# Patient Record
Sex: FEMALE | Race: ASIAN | NOT HISPANIC OR LATINO | URBAN - METROPOLITAN AREA
[De-identification: names, ages, dates, MRNs, and addresses within clinical notes are randomized per-mention and may not be internally consistent; named-entity substitution may affect disease eponyms.]

---

## 2023-12-06 ENCOUNTER — HOSPITAL ENCOUNTER (OUTPATIENT)
Dept: BEHAVIORAL HEALTH | Facility: CLINIC | Age: 33
Discharge: HOME | End: 2023-12-06
Attending: PSYCHIATRY & NEUROLOGY | Admitting: PSYCHIATRY & NEUROLOGY
Payer: COMMERCIAL

## 2023-12-06 VITALS — WEIGHT: 137 LBS | BODY MASS INDEX: 28.76 KG/M2 | HEIGHT: 58 IN

## 2023-12-06 PROCEDURE — H0001 ALCOHOL AND/OR DRUG ASSESS: HCPCS

## 2023-12-06 ASSESSMENT — COLUMBIA-SUICIDE SEVERITY RATING SCALE - C-SSRS
2. HAVE YOU ACTUALLY HAD ANY THOUGHTS OF KILLING YOURSELF?: NO
6. HAVE YOU EVER DONE ANYTHING, STARTED TO DO ANYTHING, OR PREPARED TO DO ANYTHING TO END YOUR LIFE?: NO
1. HAVE YOU WISHED YOU WERE DEAD OR WISHED YOU COULD GO TO SLEEP AND NOT WAKE UP?: NO
ATTEMPT LIFETIME: NO
TOTAL  NUMBER OF INTERRUPTED ATTEMPTS LIFETIME: NO
TOTAL  NUMBER OF ABORTED OR SELF INTERRUPTED ATTEMPTS LIFETIME: NO

## 2023-12-06 ASSESSMENT — ANXIETY QUESTIONNAIRES
1. FEELING NERVOUS, ANXIOUS, OR ON EDGE: NOT AT ALL
5. BEING SO RESTLESS THAT IT IS HARD TO SIT STILL: NOT AT ALL
GAD7 TOTAL SCORE: 0
4. TROUBLE RELAXING: NOT AT ALL
GAD7 TOTAL SCORE: 0
7. FEELING AFRAID AS IF SOMETHING AWFUL MIGHT HAPPEN: NOT AT ALL
2. NOT BEING ABLE TO STOP OR CONTROL WORRYING: NOT AT ALL
3. WORRYING TOO MUCH ABOUT DIFFERENT THINGS: NOT AT ALL
6. BECOMING EASILY ANNOYED OR IRRITABLE: NOT AT ALL

## 2023-12-06 ASSESSMENT — PATIENT HEALTH QUESTIONNAIRE - PHQ9: SUM OF ALL RESPONSES TO PHQ QUESTIONS 1-9: 0

## 2023-12-06 ASSESSMENT — PAIN SCALES - GENERAL: PAINLEVEL: NO PAIN (0)

## 2023-12-06 NOTE — PROGRESS NOTES
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 access to her substance use disorder assessment medical record.    As evidenced by self report and criteria, the patient meets the following DSM-5 Diagnoses: (Sustained by DSM-5 Criteria Listed Above)      The patient does not currently identify with a DSM-5 substance use disorder.    Specify if: In early remission:  After full criteria for alcohol/drug use disorder were previously met, none of the criteria for alcohol/drug use disorder have been met for at least 3 months but for less than 12 months (with the exception that Criterion A4,  Craving or a strong desire or urge to use alcohol/drug  may be met).     In sustained remission:   After full criteria for alcohol use disorder were previously met, none of the criteria for alcohol/drug use disorder have been met at any time during a period of 12 months or longer (with the exception that Criterion A4,  Craving  or strong desire or urge to use alcohol/drug  may be met).     Specify if:   This additional specifier is used if the individual is in an environment where access to alcohol is restricted.    Mild: Presence of 2-3 symptoms  Moderate: Presence of 4-5 symptoms  Severe: Presence of 6 or more symptoms    Recommendations:     1. Plan for Safety and Risk Management:     Recommended that patient call 911 or go to the local ED should there be a change in any of these risk factors..            Report to child / adult protection services was not needed.    2. [facility] Referrals:      Recommendations:      1.)  It was recommended the patient continue to abstain from methamphetamine and from all other non-prescribed mood altering chemicals.   2.)  Follow all of the recommendations of her healthcare providers.  3.)  Follow all of the terms and conditions of her open case with CPS in [facility], including participating in random alcohol and drug testing to ensure compliance with abstinence from methamphetamine and from all other non-prescribed mood altering chemicals.  4.)  Additionally, if the patient were unable to maintain abstinence from methamphetamine and from all other non-prescribed mood altering chemicals, it would be recommended she have an updated substance use disorder assessment to have further recommendations made at that time as needed.       The patient reported she was willing to follow the above recommendations.      The patient would like the following family or other support people involved in their treatment:  None at this time.  The patient does not have any history of opioid abuse.      3.  Mental Health Referrals:     The patient did not appear to be in any need of a mental health referral at this time.    4. Clinical Substantiation for the above recommendations: The patient reported having a history of using methamphetamine on just 2 occasions in her life during the month prior to giving birth to her  daughter in 6/2023.  The collateral data provided by this patient's [facility] , Yohana, supported the patient's account of her chemical use history and her chemical use consequences.  Yohana confirmed the patient had tested positive for methamphetamine after the birth of her daughter in 6/2023.  Yohana also reported the patient's youngest son and the patient's  had tested positive for methamphetamine in 6/2023.  Yohana reported since 6/2023, the patient had been taking alcohol and drug screens for [facility] between 2-3 times per week.  Yohana reported the patient had never missed a scheduled [facility] drug screen and every single [facility] drug screen the patient had taken since 6/2023 had come back clean for all of the drugs on the screen.  Yohana reported the patient faithfully visits her children at the foster provider's home on a daily basis.  Yohana reported the patient's  had been continuing to struggle with abusing methamphetamine and Yohana reported almost all of the patient's 's drug screens with CPS had been coming back as positive for methamphetamine, which severely complicates their 5 children who are currently in foster care returning to the family home.  The patient was informed if she were unable to maintain abstinence from methamphetamine and from all other non-prescribed mood altering chemicals, it would be recommended she have an updated substance use disorder assessment to have further recommendations made as needed at that time.       5.  Cultural Concerns:    The patient identified a language barrier: The patient's primary language is Lul[place] and she will need a [place]  for all of her treatment services.    6. Recommendations for treatment focus:      Alcohol / Substance Use - See #2. [facility] Referrals above for details on recommendations.    7. Interactive Complexity: No    8. Safety Plan:  Patient denied any current/recent/lifetime  history of suicidal ideation and/or behaviors.  No safety plan indicated at this time.     Provider Name/ Credentials:  CHRISTIANO Diop  December 6, 2023